# Patient Record
Sex: FEMALE | Race: WHITE | NOT HISPANIC OR LATINO | Employment: FULL TIME | RURAL
[De-identification: names, ages, dates, MRNs, and addresses within clinical notes are randomized per-mention and may not be internally consistent; named-entity substitution may affect disease eponyms.]

---

## 2021-11-04 ENCOUNTER — OFFICE VISIT (OUTPATIENT)
Dept: CARDIOLOGY | Facility: CLINIC | Age: 46
End: 2021-11-04

## 2021-11-04 VITALS
HEIGHT: 66 IN | HEART RATE: 76 BPM | WEIGHT: 163 LBS | DIASTOLIC BLOOD PRESSURE: 86 MMHG | SYSTOLIC BLOOD PRESSURE: 128 MMHG | BODY MASS INDEX: 26.2 KG/M2

## 2021-11-04 DIAGNOSIS — R00.2 PALPITATIONS: Primary | ICD-10-CM

## 2021-11-04 PROCEDURE — 99203 OFFICE O/P NEW LOW 30 MIN: CPT | Performed by: SPECIALIST

## 2021-11-04 RX ORDER — METHIMAZOLE 5 MG/1
5 TABLET ORAL DAILY
COMMUNITY
Start: 2021-08-26

## 2021-11-04 RX ORDER — METOPROLOL SUCCINATE 25 MG/1
25 TABLET, EXTENDED RELEASE ORAL DAILY
COMMUNITY
Start: 2021-09-03 | End: 2021-11-08 | Stop reason: SDUPTHER

## 2021-11-04 RX ORDER — FLUTICASONE PROPIONATE 50 MCG
2 SPRAY, SUSPENSION (ML) NASAL DAILY
COMMUNITY

## 2021-11-04 RX ORDER — CETIRIZINE HYDROCHLORIDE 10 MG/1
10 TABLET ORAL DAILY
COMMUNITY

## 2021-11-04 NOTE — PROGRESS NOTES
[facility]   Cardiology Consult Note    Patient Name: Keyla Still  : 1975  Referring Physician: No ref. provider found  Subjective   Subjective     Reason for Consult/ Chief Complaint:   Chief Complaint   Patient presents with   • Palpitations     Every once on a while - was a patient a few years ago - stress related       HPI:  Keyla Still is a 46 y.o. female history of palpitations on and off for last several years.  Palpitations had increased a few weeks ago.  Has since stabilized.  Got better.  No chest pain or shortness of breath.  No syncopal or presyncopal episodes.    Review of Systems:   Constitutional no fever,  no weight loss   Skin no rash   Otolaryngeal no difficulty swallowing   Cardiovascular See HPI   Pulmonary no cough, no sputum production   Gastrointestinal no constipation, no diarrhea   Genitourinary no dysuria, no hematuria   Hematologic no easy bruisability, no abnormal bleeding   Musculoskeletal no muscle pain   Neurologic no dizziness, no falls     Personal History     Past Medical History:  Past Medical History:   Diagnosis Date   • Palpitations        Family History: History reviewed. No pertinent family history.    Social History:  reports that she has never smoked. She has never used smokeless tobacco. She reports that she does not drink alcohol and does not use drugs.    Home Medications:  cetirizine, fluticasone, methIMAzole, and metoprolol succinate XL    Allergies:  Allergies   Allergen Reactions   • Prochlorperazine Anaphylaxis   • Sulfa Antibiotics Unknown - Low Severity       Objective    Objective     Vitals:   Heart Rate:  [76] 76  BP: (128)/(86) 128/86  Body mass index is 26.31 kg/m².  Physical Exam:   Constitutional: Awake, alert, No acute distress    Eyes: PERRLA, sclerae anicteric, no conjunctival injection   HENT: NCAT, mucous membranes moist   Neck: Supple, no thyromegaly, no lymphadenopathy, trachea midline   Respiratory: Clear to auscultation bilaterally,  nonlabored respirations    Cardiovascular: RRR, no murmurs or rubs. Palpable pedal pulses bilaterally   Gastrointestinal: Positive bowel sounds, soft, nontender, nondistended   Musculoskeletal: No bilateral ankle edema, no clubbing or cyanosis to extremities   Psychiatric: Appropriate affect, cooperative   Neurologic: Oriented x 3, strength symmetric in all extremities, Cranial Nerves grossly intact to confrontation, speech clear   Skin: No rashes     Result Review    Result Review:  I have personally reviewed the available results:  [x]  Laboratory  [x]  EKG/Telemetry   [x]  Cardiology/Vascular   [x] Medications  [x]  Old records          Procedures     Impression/Plan  1.  Increased palpitations: 24-hour Holter to evaluate for any significant arrhythmias.  Echocardiogram to evaluate for mitral valve prolapse.  Continue Toprol-XL 25 mg once a day.  Increase Toprol-XL to 25 mg twice daily for increased palpitations.        Electronically signed by Lorenzo Dawson MD, 11/04/21, 1:29 PM EDT.

## 2021-11-08 RX ORDER — METOPROLOL SUCCINATE 25 MG/1
25 TABLET, EXTENDED RELEASE ORAL DAILY
Qty: 90 TABLET | Refills: 3 | Status: SHIPPED | OUTPATIENT
Start: 2021-11-08 | End: 2022-11-07 | Stop reason: SDUPTHER

## 2021-11-08 NOTE — TELEPHONE ENCOUNTER
Patient called stating her prescription for Toprol XL 25mg qd was not sent in after her visit on 11/4/21.    Note says continue Toprol xl 25mg qd; then it say increase Toprol XL to Bid for increased palpitaions.      Patient states she was told to continue her current dose on 25mg qd.  He did not tell her to increase it and she states she is doing fine on current dose.

## 2022-11-07 RX ORDER — METOPROLOL SUCCINATE 25 MG/1
25 TABLET, EXTENDED RELEASE ORAL DAILY
Qty: 90 TABLET | Refills: 3 | Status: SHIPPED | OUTPATIENT
Start: 2022-11-07

## 2023-09-29 NOTE — PROGRESS NOTES
[facility]  Cardiology progress Note    Patient Name: Keyla Still  : 1975    CHIEF COMPLAINT  Palpitations        Subjective   Subjective     HISTORY OF PRESENT ILLNESS    Keyla Still is a 48 y.o. female with history of palpitations.  No further palpitations.    REVIEW OF SYSTEMS    Constitutional:    No fever, no weight loss  Skin:     No rash  Otolaryngeal:    No difficulty swallowing  Cardiovascular: See HPI.  Pulmonary:    No cough, no sputum production    Personal History     Social History:    reports that she has never smoked. She has never used smokeless tobacco. She reports that she does not drink alcohol and does not use drugs.    Home Medications:  Current Outpatient Medications on File Prior to Visit   Medication Sig    cetirizine (zyrTEC) 10 MG tablet Take 1 tablet by mouth Daily.    fluticasone (FLONASE) 50 MCG/ACT nasal spray 2 sprays into the nostril(s) as directed by provider Daily.    methIMAzole (TAPAZOLE) 5 MG tablet Take 1 tablet by mouth Daily.    [DISCONTINUED] metoprolol succinate XL (TOPROL-XL) 25 MG 24 hr tablet Take 1 tablet by mouth Daily.     No current facility-administered medications on file prior to visit.       Past Medical History:   Diagnosis Date    Palpitations        Allergies:  Allergies   Allergen Reactions    Prochlorperazine Anaphylaxis    Sulfa Antibiotics Unknown - Low Severity       Objective    Objective       Vitals:   Heart Rate:  [80] 80  BP: (124)/(72) 124/72  Body mass index is 26.63 kg/m².     PHYSICAL EXAM:    General Appearance:   well developed  well nourished  HENT:   oropharynx moist  lips not cyanotic  Neck:  thyroid not enlarged  supple  Respiratory:  no respiratory distress  normal breath sounds  no rales  Cardiovascular:  no jugular venous distention  regular rhythm  apical impulse normal  S1 normal, S2 normal  no S3, no S4   no murmur  no rub, no thrill  carotid pulses normal; no bruit  pedal pulses normal  lower extremity edema: none     Skin:   warm, dry  Psychiatric:  judgement and insight appropriate  normal mood and affect        Result Review:  I have personally reviewed the available results from  [x]  Laboratory  [x]  EKG  [x]  Cardiology  [x]  Medications  [x]  Old records  []  Other:     Procedures    Results for orders placed in visit on 02/14/22    Adult Transthoracic Echo Complete W/ Cont if Necessary Per Protocol    Interpretation Summary  Normal left ventricular systolic function.  Trace MR and trace TR     Impression/Plan:  1.  Stable palpitations: Continue Toprol-XL 25 mg once a day.  No further palpitations.           Lorenzo Dawson MD   10/02/23   14:03 EDT

## 2023-10-02 ENCOUNTER — OFFICE VISIT (OUTPATIENT)
Dept: CARDIOLOGY | Facility: CLINIC | Age: 48
End: 2023-10-02
Payer: COMMERCIAL

## 2023-10-02 VITALS
HEART RATE: 80 BPM | SYSTOLIC BLOOD PRESSURE: 124 MMHG | DIASTOLIC BLOOD PRESSURE: 72 MMHG | BODY MASS INDEX: 26.52 KG/M2 | HEIGHT: 66 IN | WEIGHT: 165 LBS

## 2023-10-02 DIAGNOSIS — R00.2 PALPITATIONS: Primary | ICD-10-CM

## 2023-10-02 PROCEDURE — 99213 OFFICE O/P EST LOW 20 MIN: CPT | Performed by: SPECIALIST

## 2023-10-02 RX ORDER — METOPROLOL SUCCINATE 25 MG/1
25 TABLET, EXTENDED RELEASE ORAL DAILY
Qty: 90 TABLET | Refills: 3 | Status: SHIPPED | OUTPATIENT
Start: 2023-10-02

## 2024-10-07 NOTE — PROGRESS NOTES
[facility]  Cardiology progress Note    Patient Name: Keyla Still  : 1975    CHIEF COMPLAINT  Palpitations        Subjective   Subjective     HISTORY OF PRESENT ILLNESS    Keyla Still is a 49 y.o. female with history of palpitations.  No further palpitations.    REVIEW OF SYSTEMS    Constitutional:    No fever, no weight loss  Skin:     No rash  Otolaryngeal:    No difficulty swallowing  Cardiovascular: See HPI.  Pulmonary:    No cough, no sputum production    Personal History     Social History:    reports that she has never smoked. She has never used smokeless tobacco. She reports that she does not drink alcohol and does not use drugs.    Home Medications:  Current Outpatient Medications on File Prior to Visit   Medication Sig    Aygestin 5 MG tablet Take 1 tablet by mouth Daily.    B Complex Vitamins (B COMPLEX 1 PO) Take 1 tablet by mouth Daily.    cetirizine (zyrTEC) 10 MG tablet Take 1 tablet by mouth Daily.    Ferrous Sulfate-Folic Acid (SLOW FE/FOLIC ACID PO) Take 1 tablet by mouth 2 (Two) Times a Day.    fluticasone (FLONASE) 50 MCG/ACT nasal spray Administer 2 sprays into the nostril(s) as directed by provider Daily.    methIMAzole (TAPAZOLE) 10 MG tablet Take 1 tablet by mouth Daily.    [DISCONTINUED] metoprolol succinate XL (TOPROL-XL) 25 MG 24 hr tablet Take 1 tablet by mouth Daily.    [DISCONTINUED] methIMAzole (TAPAZOLE) 5 MG tablet Take 1 tablet by mouth Daily.     No current facility-administered medications on file prior to visit.       Past Medical History:   Diagnosis Date    Mitral valve prolapse 90’s    Palpitations        Allergies:  Allergies   Allergen Reactions    Prochlorperazine Anaphylaxis    Sulfa Antibiotics Unknown - Low Severity       Objective    Objective       Vitals:   Heart Rate:  [80] 80  BP: (128)/(76) 128/76  Body mass index is 27.12 kg/m².     PHYSICAL EXAM:    General Appearance:   well developed  well nourished  HENT:   oropharynx moist  lips not  cyanotic  Neck:  thyroid not enlarged  supple  Respiratory:  no respiratory distress  normal breath sounds  no rales  Cardiovascular:  no jugular venous distention  regular rhythm  apical impulse normal  S1 normal, S2 normal  no S3, no S4   no murmur  no rub, no thrill  carotid pulses normal; no bruit  pedal pulses normal  lower extremity edema: none    Skin:   warm, dry  Psychiatric:  judgement and insight appropriate  normal mood and affect        Result Review:  I have personally reviewed the available results from  [x]  Laboratory  [x]  EKG  [x]  Cardiology  [x]  Medications  [x]  Old records  []  Other:     Procedures    Results for orders placed in visit on 02/14/22    Adult Transthoracic Echo Complete W/ Cont if Necessary Per Protocol    Interpretation Summary  Normal left ventricular systolic function.  Trace MR and trace TR     Impression/Plan:  1.  Stable palpitations: Continue Toprol-XL 25 mg once a day.  No palpitations.           Lorenzo Dawson MD   10/10/24   10:48 EDT

## 2024-10-10 ENCOUNTER — OFFICE VISIT (OUTPATIENT)
Dept: CARDIOLOGY | Facility: CLINIC | Age: 49
End: 2024-10-10
Payer: COMMERCIAL

## 2024-10-10 VITALS
HEIGHT: 66 IN | WEIGHT: 168 LBS | HEART RATE: 80 BPM | DIASTOLIC BLOOD PRESSURE: 76 MMHG | SYSTOLIC BLOOD PRESSURE: 128 MMHG | BODY MASS INDEX: 27 KG/M2

## 2024-10-10 DIAGNOSIS — R00.2 PALPITATIONS: Primary | ICD-10-CM

## 2024-10-10 PROCEDURE — 99213 OFFICE O/P EST LOW 20 MIN: CPT | Performed by: SPECIALIST

## 2024-10-10 RX ORDER — NORETHINDRONE ACETATE 5 MG/1
5 TABLET ORAL DAILY
COMMUNITY
Start: 2024-09-27

## 2024-10-10 RX ORDER — METOPROLOL SUCCINATE 25 MG/1
25 TABLET, EXTENDED RELEASE ORAL DAILY
Qty: 90 TABLET | Refills: 3 | Status: SHIPPED | OUTPATIENT
Start: 2024-10-10 | End: 2024-10-10 | Stop reason: SDUPTHER

## 2024-10-10 RX ORDER — METHIMAZOLE 10 MG/1
1 TABLET ORAL DAILY
COMMUNITY
Start: 2024-07-16

## 2024-10-10 RX ORDER — METOPROLOL SUCCINATE 25 MG/1
25 TABLET, EXTENDED RELEASE ORAL DAILY
Qty: 90 TABLET | Refills: 3 | Status: SHIPPED | OUTPATIENT
Start: 2024-10-10